# Patient Record
Sex: FEMALE | URBAN - METROPOLITAN AREA
[De-identification: names, ages, dates, MRNs, and addresses within clinical notes are randomized per-mention and may not be internally consistent; named-entity substitution may affect disease eponyms.]

---

## 2023-06-21 ENCOUNTER — DOCUMENTATION ONLY (OUTPATIENT)
Dept: GASTROENTEROLOGY | Facility: CLINIC | Age: 43
End: 2023-06-21

## 2023-06-21 ENCOUNTER — TELEPHONE (OUTPATIENT)
Dept: TRANSPLANT | Facility: CLINIC | Age: 43
End: 2023-06-21

## 2023-06-21 NOTE — TELEPHONE ENCOUNTER
MomColleen is CBO/Caregiver (will need to prove this, send us a copy)        E-mail: [email or web address]  (mom's email)     Emergency Contact: Colleen Grande/Mother  PH# [phone]    Organ: Liver    Referral: /No,      Patient is in Pt;  [facility] [place]     3rd time Daxa has been placed inpatient at  since may 11th, 2023.

## 2023-06-21 NOTE — PROGRESS NOTES
Called for consideration of transfer for Liver transplant evaluation in person recently sober    Reported sobriety 3 weeks ago  - has been informed of need to stop alcohol use repeatedly in past (per report of caller)  - Is significantly encephalopathic  - Has a past medical history of ? Thyrotoxicosis and PTSD  - Noted that the patient has a G-tube placed for severe protein calorie malnutrition    A/P:  - The patient is not a candidate for consideration of early liver transplant for alcohol related liver disease at the [facility] for the following reasons, based on our protocol requirements:   - continued to use alcohol after being informed of medical risks   - Is significantly encephalopathic   - Has severe protein calorie malnutrition.    Thomas M. Leventhal, M.D.   of Medicine  Advanced/Transplant Hepatology & Critical Care Medicine  [facility]  June 21, 2023 3:31 PM